# Patient Record
(demographics unavailable — no encounter records)

---

## 2025-04-22 NOTE — DISCUSSION/SUMMARY
[de-identified] : The patient was advised of the diagnosis.  The natural history of the pathology was explained in full to the patient in layman's terms. All questions were answered.  The risks and benefits of surgical and non-surgical treatment alternatives were explained in full to the patient.

## 2025-04-22 NOTE — DISCUSSION/SUMMARY
[de-identified] : The patient was advised of the diagnosis.  The natural history of the pathology was explained in full to the patient in layman's terms. All questions were answered.  The risks and benefits of surgical and non-surgical treatment alternatives were explained in full to the patient.

## 2025-04-22 NOTE — ASSESSMENT
[FreeTextEntry1] : 4/22/25: 11 year s/p L ALXE by me. New onset of pain in past 3 weeks after golf- looks like abductor and flexor strain. XR show ALEX in good position. She has been taking NSAIDs for long time due to other issues- worried about kidneys- will try Medrol Dose Pack and re eval in 4 weeks. No sign of infection or prosthesis issue at this point.

## 2025-04-22 NOTE — IMAGING
[de-identified] : LEFT HIP incision healed no swelling greater troch tenderness hip flexor tenderness pain with resisted hip flexion neg Trendelenburg 5/5 strength Ambulates without assistance  Xray 3 views LEFT hip/pelvis (4/22/25) - Implants good position and well fixed - no fracture or dislocation and Leg length wnl

## 2025-04-22 NOTE — ASSESSMENT
[FreeTextEntry1] : 4/22/25: 11 year s/p L ALEX by me. New onset of pain in past 3 weeks after golf- looks like abductor and flexor strain. XR show AELX in good position. She has been taking NSAIDs for long time due to other issues- worried about kidneys- will try Medrol Dose Pack and re eval in 4 weeks. No sign of infection or prosthesis issue at this point.

## 2025-04-22 NOTE — HISTORY OF PRESENT ILLNESS
[de-identified] : 4/22/25: 73F known to me for L ALEX 2014. Here today for LT hip pain x 3 weeks. No specific injury/trauma but pain starts after swinging golf clubs. Mostly lateral pain- some groin pain. No fevers/chills. Reports no pain prior to this. She was taking Mobic for foot injury but stopped this to protect her kidneys   [FreeTextEntry1] : left hip  [FreeTextEntry5] : Left hip pain worsening in the past three weeks, No injury. clicking sensation when walking. Having groin and posterior pain. h/o of left barbara By nitesh 3/3/2014

## 2025-04-22 NOTE — HISTORY OF PRESENT ILLNESS
[de-identified] : 4/22/25: 73F known to me for L ALEX 2014. Here today for LT hip pain x 3 weeks. No specific injury/trauma but pain starts after swinging golf clubs. Mostly lateral pain- some groin pain. No fevers/chills. Reports no pain prior to this. She was taking Mobic for foot injury but stopped this to protect her kidneys   [FreeTextEntry1] : left hip  [FreeTextEntry5] : Left hip pain worsening in the past three weeks, No injury. clicking sensation when walking. Having groin and posterior pain. h/o of left barbara By nitesh 3/3/2014

## 2025-04-22 NOTE — IMAGING
[de-identified] : LEFT HIP incision healed no swelling greater troch tenderness hip flexor tenderness pain with resisted hip flexion neg Trendelenburg 5/5 strength Ambulates without assistance  Xray 3 views LEFT hip/pelvis (4/22/25) - Implants good position and well fixed - no fracture or dislocation and Leg length wnl

## 2025-06-23 NOTE — HISTORY OF PRESENT ILLNESS
[de-identified] : 6/23/25: No significant improvement since last visit.  MDP did not help.  Saw rheum who did not think there was any inflammatory disease - started mobic with very mild relief.  Noticing clicking in left groin when she walks.  Previous doc: 4/22/25: 73F known to me for L ALEX 2014. Here today for LT hip pain x 3 weeks. No specific injury/trauma but pain starts after swinging golf clubs. Mostly lateral pain- some groin pain. No fevers/chills. Reports no pain prior to this. She was taking Mobic for foot injury but stopped this to protect her kidneys

## 2025-06-23 NOTE — ASSESSMENT
[FreeTextEntry1] : Previous doc: 4/22/25: 11 year s/p L ALEX by me. New onset of pain in past 3 weeks after golf- looks like abductor and flexor strain. XR show ALEX in good position. She has been taking NSAIDs for long time due to other issues- worried about kidneys- will try Medrol Dose Pack and re eval in 4 weeks. No sign of infection or prosthesis issue at this point.  6/23/25: Cont pain - severe degen Lspine changes on XR could be causing symptoms however still compaining of clicking in groin, unsure what this is.  Recc MRI Lspine eval nerve impingement and MRI left hip eval for fluid collection, psoas injury.

## 2025-06-23 NOTE — DISCUSSION/SUMMARY
[de-identified] : The patient was advised of the diagnosis.  The natural history of the pathology was explained in full to the patient in layman's terms. All questions were answered.  The risks and benefits of surgical and non-surgical treatment alternatives were explained in full to the patient.

## 2025-06-23 NOTE — IMAGING
[Facet arthropathy] : Facet arthropathy [Disc space narrowing] : Disc space narrowing [Scoliosis] : Scoliosis [de-identified] : LEFT HIP incision healed no swelling no greater troch tenderness mild groin tenderness no pain with resisted hip flexion no pain with hip rotation neg Trendelenburg 5/5 strength Ambulates without assistance

## 2025-07-15 NOTE — HISTORY OF PRESENT ILLNESS
[de-identified] : 7/14/25: Pt here for LT hip MRI review. Pain persists. Mobic/MDP with mild relief. Clicking and pain in left groin.  Previous doc: 4/22/25: 73F known to me for L ALEX 2014. Here today for LT hip pain x 3 weeks. No specific injury/trauma but pain starts after swinging golf clubs. Mostly lateral pain- some groin pain. No fevers/chills. Reports no pain prior to this. She was taking Mobic for foot injury but stopped this to protect her kidneys 6/23/25: No significant improvement since last visit.  MDP did not help.  Saw rheum who did not think there was any inflammatory disease - started mobic with very mild relief.  Noticing clicking in left groin when she walks.  [FreeTextEntry5] : Patient here for MRI results today. Patient has her MRI done at [facility]. Patient states no changes since last visit.

## 2025-07-15 NOTE — DISCUSSION/SUMMARY
[de-identified] : The patient was advised of the diagnosis.  The natural history of the pathology was explained in full to the patient in layman's terms. All questions were answered.  The risks and benefits of surgical and non-surgical treatment alternatives were explained in full to the patient.  Entered by Latoya Peres acting as a scribe.

## 2025-07-15 NOTE — DISCUSSION/SUMMARY
[de-identified] : The patient was advised of the diagnosis.  The natural history of the pathology was explained in full to the patient in layman's terms. All questions were answered.  The risks and benefits of surgical and non-surgical treatment alternatives were explained in full to the patient.  Entered by Latoya Peres acting as a scribe.

## 2025-07-15 NOTE — HISTORY OF PRESENT ILLNESS
[de-identified] : 7/14/25: Pt here for LT hip MRI review. Pain persists. Mobic/MDP with mild relief. Clicking and pain in left groin.  Previous doc: 4/22/25: 73F known to me for L ALEX 2014. Here today for LT hip pain x 3 weeks. No specific injury/trauma but pain starts after swinging golf clubs. Mostly lateral pain- some groin pain. No fevers/chills. Reports no pain prior to this. She was taking Mobic for foot injury but stopped this to protect her kidneys 6/23/25: No significant improvement since last visit.  MDP did not help.  Saw rheum who did not think there was any inflammatory disease - started mobic with very mild relief.  Noticing clicking in left groin when she walks.  [FreeTextEntry5] : Patient here for MRI results today. Patient has her MRI done at [facility]. Patient states no changes since last visit.

## 2025-07-15 NOTE — IMAGING
[Facet arthropathy] : Facet arthropathy [Disc space narrowing] : Disc space narrowing [Scoliosis] : Scoliosis [de-identified] : LEFT HIP incision healed no swelling no greater troch tenderness mild groin tenderness no pain with resisted hip flexion no pain with hip rotation neg Trendelenburg 5/5 strength Ambulates without assistance

## 2025-07-15 NOTE — PROCEDURE
[FreeTextEntry3] : Large joint injection was performed of the LT greater trochanteric bursa using u/s. The indication for this procedure was pain and inflammation. The site was prepped with alcohol, betadine, ethyl chloride sprayed topically and sterile technique used. An injection of Betamethasone (Celestone) 2cc of 3mg, Lidocaine 7cc of 1% , Bupivacaine (Marcaine) 4cc of 0.25%  was used. Patient tolerated procedure well. Patient was advised to call if redness, pain or fever occur and apply ice for 15 minutes out of every hour for the next 12-24 hours as tolerated.   Patient has tried OTC's including aspirin, Ibuprofen, Aleve, etc or prescription NSAIDS, and/or exercises at home and/or physical therapy without satisfactory response, patient had decreased mobility in the joint and the risks benefits, and alternatives have been discussed, and verbal consent was obtained.

## 2025-07-15 NOTE — ASSESSMENT
[FreeTextEntry1] : Previous doc: 4/22/25: 11 year s/p L ALEX by me. New onset of pain in past 3 weeks after golf- looks like abductor and flexor strain. XR show ALEX in good position. She has been taking NSAIDs for long time due to other issues- worried about kidneys- will try Medrol Dose Pack and re eval in 4 weeks. No sign of infection or prosthesis issue at this point. 6/23/25: Cont pain - severe degen Lspine changes on XR could be causing symptoms however still compaining of clicking in groin, unsure what this is.  Recc MRI Lspine eval nerve impingement and MRI left hip eval for fluid collection, psoas injury.  7/14/25: Pt w LT hip pain. 11-year s/p L ALEX by me. MRI showing inflammation in LT hip and spinal stenosis. Discussed depo in hip or epidural in l spine as diagnostic to further evaluate pain. r/b/a of injections. I believe since this occurred after surgery, it may be correlated to scarring. No signs of infection or laxity issues. Discussed need for stretching to enhance ROM. Will prescribe PT. Pt elects to proceed with a left bursas inj under u/s for pain. Will RTC if pain persists to further eval.

## 2025-07-15 NOTE — IMAGING
[Facet arthropathy] : Facet arthropathy [Disc space narrowing] : Disc space narrowing [Scoliosis] : Scoliosis [de-identified] : LEFT HIP incision healed no swelling no greater troch tenderness mild groin tenderness no pain with resisted hip flexion no pain with hip rotation neg Trendelenburg 5/5 strength Ambulates without assistance

## 2025-07-15 NOTE — DATA REVIEWED
[Left] : left [Hip] : hip [MRI] : MRI [Lumbar Spine] : lumbar spine [Report was reviewed and noted in the chart] : The report was reviewed and noted in the chart [I independently reviewed and interpreted images and report] : I independently reviewed and interpreted images and report [I reviewed the films/CD] : I reviewed the films/CD [FreeTextEntry1] :  6/27/25- [facility]- independently interpreted 7/14/25: L HIP: All hardware in place, tendinosis and partial tearing of L gluteus minimus/medius, troch bursitis, R>L, mild OA [FreeTextEntry2] :  6/27/25- [facility]- independently interpreted 7/14/25: L SPINE: Mod DDD, stenosis and narrowing in spinal cord